# Patient Record
Sex: MALE | Race: WHITE | URBAN - METROPOLITAN AREA
[De-identification: names, ages, dates, MRNs, and addresses within clinical notes are randomized per-mention and may not be internally consistent; named-entity substitution may affect disease eponyms.]

---
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
## 2018-01-17 ENCOUNTER — EMERGENCY (EMERGENCY)
Facility: HOSPITAL | Age: 18
LOS: 0 days | Discharge: HOME | End: 2018-01-17
Attending: EMERGENCY MEDICINE | Admitting: EMERGENCY MEDICINE
Payer: MEDICAID

## 2018-01-17 VITALS
OXYGEN SATURATION: 99 % | TEMPERATURE: 98 F | HEART RATE: 62 BPM | SYSTOLIC BLOOD PRESSURE: 121 MMHG | RESPIRATION RATE: 18 BRPM | DIASTOLIC BLOOD PRESSURE: 61 MMHG

## 2018-01-17 VITALS — HEIGHT: 70 IN | WEIGHT: 246.92 LBS

## 2018-01-17 DIAGNOSIS — S83.92XA SPRAIN OF UNSPECIFIED SITE OF LEFT KNEE, INITIAL ENCOUNTER: ICD-10-CM

## 2018-01-17 DIAGNOSIS — M25.562 PAIN IN LEFT KNEE: ICD-10-CM
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
## 2018-01-17 PROCEDURE — 73562 X-RAY EXAM OF KNEE 3: CPT | Mod: 26,LT

## 2018-01-17 PROCEDURE — 99284 EMERGENCY DEPT VISIT MOD MDM: CPT

## 2018-01-17 RX ORDER — IBUPROFEN 200 MG
600 TABLET ORAL ONCE
Qty: 0 | Refills: 0 | Status: COMPLETED | OUTPATIENT
Start: 2018-01-17 | End: 2018-01-17

## 2018-01-17 RX ADMIN — Medication 600 MILLIGRAM(S): at 14:10

## 2018-01-17 NOTE — ED ADULT NURSE NOTE - OBJECTIVE STATEMENT
Pt c/o left knee pain. Evaluated by NP with NP to bedside to admin knee immobilizer. Witnessed instruction for use of crutches with demonstrated understanding.

## 2018-01-17 NOTE — ED STATDOCS - PROGRESS NOTE DETAILS
17 yr. old male PMH: Torn Meniscus presents to ED with pain and injury to left knee while at [employer] doing [employer] jitsu  pain last night and unable to walk without pain this morning. Seen and examined by attending in intake. Plan: X-Ray and pain medication. Will F/U with results and re evaluate. Sebastien MCKEON

## 2018-01-17 NOTE — ED STATDOCS - SKIN, MLM
skin normal color for race, warm, dry and intact. skin normal color for race, warm, dry and intact. No rashes.

## 2018-01-17 NOTE — ED STATDOCS - NS_ ATTENDINGSCRIBEDETAILS _ED_A_ED_FT
I Jose A Salazar MD saw and examined the patient. Scribe documented for me and under my supervision. I have modified the scribe's documentation where necessary to reflect my history, physical exam findings and other relevant documentation pertinent to the care of this patient.

## 2018-01-17 NOTE — ED STATDOCS - ATTENDING CONTRIBUTION TO CARE
I Jose A Salazar MD saw and examined the patient. MLP saw and examined the patient. I discussed the care of the patient with MLP and agree with MLP's plan, assessment and care of the patient.

## 2018-01-17 NOTE — ED STATDOCS - OBJECTIVE STATEMENT
18 y/o male with no PMHx presents to the ED c/o left knee pain s/p hearing a "pop" while working out a week ago. Pt went to his pediatrician who diagnosed it as a strain. Pt heard another "pop" while ta the gym. Pain has increased since yesterday. Pt states he has had torn lateral meniscus in the past. 18 y/o male with no PMHx presents to the ED c/o left knee pain s/p hearing a "pop" while working out a week ago. Pt went to his pediatrician who diagnosed it as a strain. Pt heard another "pop" while at the gym. Pain has increased since yesterday. Pt states he has had torn lateral meniscus in the past.  No saddle anesthesia. No incontinence. No recent exotic travel. Can ambulate but with pain in the knee.

## 2018-01-17 NOTE — ED STATDOCS - MUSCULOSKELETAL, MLM
+TTP of left knee, mild effusion of left knee. Intact ROM of flexion and extension. 2+ pulses in popliteal arteries.

## 2019-03-23 ENCOUNTER — EMERGENCY (EMERGENCY)
Facility: HOSPITAL | Age: 19
LOS: 0 days | Discharge: HOME | End: 2019-03-23
Attending: EMERGENCY MEDICINE | Admitting: EMERGENCY MEDICINE
Payer: MEDICAID

## 2019-03-23 VITALS
HEIGHT: 70 IN | DIASTOLIC BLOOD PRESSURE: 65 MMHG | RESPIRATION RATE: 18 BRPM | WEIGHT: 244.93 LBS | TEMPERATURE: 97 F | SYSTOLIC BLOOD PRESSURE: 131 MMHG | HEART RATE: 67 BPM | OXYGEN SATURATION: 100 %

## 2019-03-23 DIAGNOSIS — S43.101A UNSPECIFIED DISLOCATION OF RIGHT ACROMIOCLAVICULAR JOINT, INITIAL ENCOUNTER: ICD-10-CM
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
## 2019-03-23 DIAGNOSIS — I45.6 PRE-EXCITATION SYNDROME: ICD-10-CM
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
## 2019-03-23 PROCEDURE — 99284 EMERGENCY DEPT VISIT MOD MDM: CPT

## 2019-03-23 PROCEDURE — 73060 X-RAY EXAM OF HUMERUS: CPT | Mod: 26,RT

## 2019-03-23 PROCEDURE — 73080 X-RAY EXAM OF ELBOW: CPT | Mod: 26,RT

## 2019-03-23 PROCEDURE — 73030 X-RAY EXAM OF SHOULDER: CPT | Mod: 26,RT

## 2019-03-23 RX ORDER — LIDOCAINE 4 G/100G
1 CREAM TOPICAL
Qty: 8 | Refills: 0 | OUTPATIENT
Start: 2019-03-23

## 2019-03-23 RX ORDER — OXYCODONE HYDROCHLORIDE 5 MG/1
10 TABLET ORAL ONCE
Qty: 0 | Refills: 0 | Status: DISCONTINUED | OUTPATIENT
Start: 2019-03-23 | End: 2019-03-23

## 2019-03-23 RX ADMIN — Medication 1 MILLIGRAM(S): at 17:28

## 2019-03-23 NOTE — ED STATDOCS - MUSCULOSKELETAL, MLM
No deformity in finger, wrists or elbow. +TTP of right shoulder no visible deformity. No deformity in finger, wrists or elbow. +TTP of right shoulder no visible deformity. No laxity of deformities.

## 2019-03-23 NOTE — ED STATDOCS - ATTENDING CONTRIBUTION TO CARE
I Jose A Salazar MD saw and examined the patient. MLP saw and examined the patient under my supervision. I discussed the care of the patient with MLP and agree with MLP's plan, assessment and care of the patient while in the ED.

## 2019-03-23 NOTE — ED STATDOCS - NS_ ATTENDINGSCRIBEDETAILS _ED_A_ED_FT
I Jose A Salazar MD saw and examined the patient. Scribe documented for me and under my supervision. I have modified the scribe's documentation where necessary to reflect my history, physical exam and other relevant documentations pertinent to the care of the patient.

## 2019-03-23 NOTE — ED ADULT NURSE NOTE - NSIMPLEMENTINTERV_GEN_ALL_ED
Implemented All Universal Safety Interventions:  [place] to call system. Call bell, personal items and telephone within reach. Instruct patient to call for assistance. Room bathroom lighting operational. Non-slip footwear when patient is off stretcher. Physically safe environment: no spills, clutter or unnecessary equipment. Stretcher in lowest position, wheels locked, appropriate side rails in place.

## 2019-03-23 NOTE — ED STATDOCS - CARDIAC, MLM
normal rate, regular rhythm, and no murmur. 2+ pulses in right radial artery. normal rate, regular rhythm, and no gallops or rubs. 2+ pulses in right radial artery.

## 2019-03-23 NOTE — ED STATDOCS - CARE PROVIDER_API CALL
Luis Rubio)  Orthopaedic Surgery; Surgery of the Hand  [address] [place] [address]  Phone: [phone]  Fax: [phone]  Follow Up Time:
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
## 2019-03-23 NOTE — ED STATDOCS - CLINICAL SUMMARY MEDICAL DECISION MAKING FREE TEXT BOX
Plan for pain management and XR. If shoulder dislocation, will attempt to deduce. If unsuccessful, will contact orthopedics. Plan for pain management and XR. If shoulder dislocation, will attempt to deduce. If unsuccessful, will contact orthopedics. No dislocation on XR. Sling applied. Plan for d/c home with orthopedic follow up.

## 2019-03-23 NOTE — ED STATDOCS - NEUROLOGICAL, MLM
sensation is normal and strength is normal. sensation is normal and strength is normal. CNs 2-12 grossly intact. No nuchal rigidity.

## 2019-03-29 ENCOUNTER — APPOINTMENT (OUTPATIENT)
Dept: ORTHOPEDIC SURGERY | Facility: CLINIC | Age: 19
End: 2019-03-29
Payer: COMMERCIAL

## 2019-03-29 VITALS
DIASTOLIC BLOOD PRESSURE: 68 MMHG | HEART RATE: 54 BPM | BODY MASS INDEX: 38.22 KG/M2 | HEIGHT: 70 IN | SYSTOLIC BLOOD PRESSURE: 118 MMHG | WEIGHT: 267 LBS

## 2019-03-29 DIAGNOSIS — S43.101A UNSPECIFIED DISLOCATION OF RIGHT ACROMIOCLAVICULAR JOINT, INITIAL ENCOUNTER: ICD-10-CM

## 2019-03-29 DIAGNOSIS — M75.21 BICIPITAL TENDINITIS, RIGHT SHOULDER: ICD-10-CM

## 2019-03-29 PROCEDURE — 99204 OFFICE O/P NEW MOD 45 MIN: CPT

## 2019-03-29 RX ORDER — METHYLPREDNISOLONE 4 MG/1
4 TABLET ORAL
Qty: 1 | Refills: 0 | Status: ACTIVE | COMMUNITY
Start: 2019-03-29 | End: 1900-01-01

## 2019-03-29 NOTE — ASSESSMENT
[FreeTextEntry1] : Patient with right shoulder pain consistent with a.c. joint separation and biceps tendinitis. His range of motion and pain have improved since the actual injury. He does have some residual discomfort however. The nature of this condition was described at length with the patient and he verbalized understanding. I will send the patient a Medrol Dosepak to bring down any inflammation of the joint and the biceps tendon. I would also like the patient to do a short course of physical therapy to work on range of motion exercises and to prevent stiffness. This will likely give the patient the best opportunity to return to his [facility] as soon as possible. Visual followup in 4 weeks should his pain not be alleviated with the current treatment plan. The patient is in agreement with this plan.

## 2019-03-29 NOTE — PHYSICAL EXAM
[de-identified] : Right Shoulder Exam\par \par Inspection: No malalignment, No defects\par Skin: No masses, No lesions\par Neck: Negative Spurlings, full ROM without pain\par ROM: RIGHT Active FF to 170°, abduction to 110°, ER to 60°, IR to abdomen\par Painful arc ROM: Overhead\par Tenderness: No bicipital tenderness, no tenderness to the greater tuberosity/RTC insertion, no anterior shoulder/lesser tuberosity tenderness\par Strength: 5/5 ER, 5/5 IR in adduction, 5/5 supraspinatus testing\par AC Joint: No ttp, no pain with cross arm testing\par Biceps: Speed positive\par Impingement test: Negative Thomas\par Stability: Negative apprehension, negative anterior/posterior load and shift\par Vasc: 2+ radial pulse\par Neuro: AIN, PIN, Ulnar nerve in tact to motor\par Sensation: In tact to light touch throughout\par \par  [de-identified] : 3 x-ray views of the right shoulder were reviewed from an outside institution. There are no fractures or dislocations noted. There is some mild separation of the a.c. joint.
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
## 2019-04-06 PROBLEM — I45.6 PRE-EXCITATION SYNDROME: Chronic | Status: INACTIVE | Noted: 2019-03-23 | Resolved: 2019-04-05

## 2019-07-21 NOTE — ED PROVIDER NOTE - CARDIAC, MLM
Normal rate, regular rhythm.  Heart sounds S1, S2.  No murmurs, rubs or gallops. Patient with one or more new problems requiring additional work-up/treatment.

## 2019-12-08 ENCOUNTER — TRANSCRIPTION ENCOUNTER (OUTPATIENT)
Age: 19
End: 2019-12-08

## 2020-12-05 ENCOUNTER — OUTPATIENT (OUTPATIENT)
Dept: OUTPATIENT SERVICES | Facility: HOSPITAL | Age: 20
LOS: 1 days | End: 2020-12-05
Payer: MEDICAID

## 2020-12-05 DIAGNOSIS — Z11.59 ENCOUNTER FOR SCREENING FOR OTHER VIRAL DISEASES: ICD-10-CM

## 2020-12-05 PROBLEM — I45.6 PRE-EXCITATION SYNDROME: Chronic | Status: ACTIVE | Noted: 2019-04-05

## 2020-12-05 LAB — SARS-COV-2 RNA SPEC QL NAA+PROBE: SIGNIFICANT CHANGE UP

## 2020-12-05 PROCEDURE — U0003: CPT

## 2020-12-06 DIAGNOSIS — Z11.59 ENCOUNTER FOR SCREENING FOR OTHER VIRAL DISEASES: ICD-10-CM

## 2021-04-20 ENCOUNTER — APPOINTMENT (OUTPATIENT)
Dept: ORTHOPEDIC SURGERY | Facility: CLINIC | Age: 21
End: 2021-04-20
Payer: MEDICAID

## 2021-04-20 DIAGNOSIS — M75.42 IMPINGEMENT SYNDROME OF LEFT SHOULDER: ICD-10-CM

## 2021-04-20 DIAGNOSIS — M67.814 OTHER SPECIFIED DISORDERS OF TENDON, LEFT SHOULDER: ICD-10-CM

## 2021-04-20 DIAGNOSIS — M25.512 PAIN IN LEFT SHOULDER: ICD-10-CM

## 2021-04-20 PROCEDURE — 99072 ADDL SUPL MATRL&STAF TM PHE: CPT

## 2021-04-20 PROCEDURE — 20610 DRAIN/INJ JOINT/BURSA W/O US: CPT | Mod: LT

## 2021-04-20 PROCEDURE — 73030 X-RAY EXAM OF SHOULDER: CPT | Mod: 26,LT

## 2021-04-20 PROCEDURE — 99213 OFFICE O/P EST LOW 20 MIN: CPT | Mod: 25

## 2021-04-20 RX ORDER — MELOXICAM 15 MG/1
15 TABLET ORAL
Qty: 14 | Refills: 1 | Status: ACTIVE | COMMUNITY
Start: 2021-04-20 | End: 1900-01-01

## 2021-04-20 NOTE — ASSESSMENT
[FreeTextEntry1] : Patient with left shoulder pain consistent with biceps tendinitis and bursitis. \par At this time the patient has been informed of the findings and recommended for conservative treatment of his left left shoulder biceps tendinitis and bursitis.  He has been recommended for a steroid injection today to the subacromial space, agreed to proceed, received the injection and tolerated the procedure well.  He has been recommended to avoid heavy activity over the next 2 to 3 days to allow for symptoms to resolve.  He has been recommended for home exercise program to maintain strength and mobility.  He has been provided a prescription for anti-inflammatory, meloxicam and will take it for the next 2 weeks to ease inflammation.  He has been recommended to follow-up in 4 to 6 weeks if symptoms continue for other options of treatment. no cough/no dyspnea/no wheezing/no hemoptysis

## 2021-04-20 NOTE — PROCEDURE
[FreeTextEntry1] : Injection: LEFT Shoulder\par \par A discussion was had with the patient regarding this procedure and all questions were answered. All risks, benefits and alternatives were discussed. These included but were not limited to bleeding, infection, and allergic reaction. Alcohol was used to clean and prep the area in the posterior aspect of the left shoulder. Ethyl chloride spray was then used as a topical anesthetic. A 22-gauge needle was used to inject 3cc of 1% lidocaine and 1cc of 40mg/ml Depomedrol into the subacromial space. A sterile bandage was then applied. The patient tolerated the procedure well and there were no complications.\par
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
## 2021-04-20 NOTE — PHYSICAL EXAM
[de-identified] : LEFT shoulder exam\par \par Inspection: No malalignment, No defects\par Skin: No masses, No lesions\par Neck: Negative Spurlings, full ROM without pain\par ROM: RIGHT Active FF to 170°, abduction to 110°, ER to 60°, IR to mid back.\par Painful arc ROM: Overhead\par Tenderness: positive bicipital tenderness, no tenderness to the greater tuberosity/RTC insertion, no anterior shoulder/lesser tuberosity tenderness\par Strength: 5/5 ER, 5/5 IR in adduction, 5/5 supraspinatus testing\par AC Joint: No ttp, no pain with cross arm testing\par Biceps: Speed positive\par Impingement test: Positive Thomas\par Stability: Negative apprehension, negative anterior/posterior load and shift\par Vasc: 2+ radial pulse\par Neuro: AIN, PIN, Ulnar nerve in tact to motor\par Sensation: In tact to light touch throughout\par \par  [de-identified] : 3 x-ray views of the left shoulder taken today reveal no fracture dislocation noted.  There is mild separation of the AC joint, which is comparative to the other side with review of previous right shoulder x-rays

## 2021-05-27 ENCOUNTER — EMERGENCY (EMERGENCY)
Facility: HOSPITAL | Age: 21
LOS: 0 days | Discharge: HOME | End: 2021-05-27
Attending: STUDENT IN AN ORGANIZED HEALTH CARE EDUCATION/TRAINING PROGRAM
Payer: OTHER MISCELLANEOUS

## 2021-05-27 VITALS
RESPIRATION RATE: 16 BRPM | OXYGEN SATURATION: 100 % | TEMPERATURE: 99 F | SYSTOLIC BLOOD PRESSURE: 114 MMHG | DIASTOLIC BLOOD PRESSURE: 80 MMHG | HEART RATE: 64 BPM

## 2021-05-27 VITALS — WEIGHT: 190.04 LBS | HEIGHT: 70 IN
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
## 2021-05-27 DIAGNOSIS — M54.5 LOW BACK PAIN: ICD-10-CM
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
## 2021-05-27 PROCEDURE — 99053 MED SERV 10PM-8AM 24 HR FAC: CPT

## 2021-05-27 PROCEDURE — 99283 EMERGENCY DEPT VISIT LOW MDM: CPT | Mod: 25

## 2021-05-27 PROCEDURE — 99284 EMERGENCY DEPT VISIT MOD MDM: CPT

## 2021-05-27 PROCEDURE — 96372 THER/PROPH/DIAG INJ SC/IM: CPT

## 2021-05-27 RX ORDER — LIDOCAINE 4 G/100G
1 CREAM TOPICAL ONCE
Refills: 0 | Status: COMPLETED | OUTPATIENT
Start: 2021-05-27 | End: 2021-05-27

## 2021-05-27 RX ORDER — KETOROLAC TROMETHAMINE 30 MG/ML
30 SYRINGE (ML) INJECTION ONCE
Refills: 0 | Status: DISCONTINUED | OUTPATIENT
Start: 2021-05-27 | End: 2021-05-27

## 2021-05-27 RX ORDER — CYCLOBENZAPRINE HYDROCHLORIDE 10 MG/1
1 TABLET, FILM COATED ORAL
Qty: 9 | Refills: 0
Start: 2021-05-27 | End: 2021-05-29

## 2021-05-27 RX ORDER — LIDOCAINE 4 G/100G
1 CREAM TOPICAL
Qty: 10 | Refills: 0
Start: 2021-05-27 | End: 2021-06-05

## 2021-05-27 RX ADMIN — Medication 30 MILLIGRAM(S): at 09:15

## 2021-05-27 RX ADMIN — LIDOCAINE 1 PATCH: 4 CREAM TOPICAL at 09:15

## 2021-05-27 NOTE — ED ADULT NURSE NOTE - NURSING NEURO LEVEL OF CONSCIOUSNESS
CERTIFICATE OF WORK      April 14, 2021      RE:   Delvin Lorenzana  [address]  [place] [address]    To whom it may concern:    This is to certify that Delvin Lorenzana has been under Ap Tucker MD's care from 4/14/2021 and is excused from work on 4/14/2021.        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  
alert and awake

## 2021-05-27 NOTE — ED PROVIDER NOTE - NSFOLLOWUPINSTRUCTIONS_ED_ALL_ED_FT
Back Pain    WHAT YOU NEED TO KNOW:    Back pain is common. It can be caused by many conditions, such as arthritis or the breakdown of spinal discs. Your risk for back pain is increased by injuries, lack of activity, or repeated bending and twisting. You may feel sore or stiff on one or both sides of your back. The pain may spread to your buttocks or thighs.    DISCHARGE INSTRUCTIONS:    Return to the emergency department if:     You have pain, numbness, or weakness in one or both legs.      Your pain becomes so severe that you cannot walk.      You cannot control your urine or bowel movements.      You have severe back pain with chest pain.      You have severe back pain, nausea, and vomiting.      You have severe back pain that spreads to your side or genital area.    Contact your healthcare provider if:     You have back pain that does not get better with rest and pain medicine.      You have a fever.      You have pain that worsens when you are on your back or when you rest.      You have pain that worsens when you cough or sneeze.      You lose weight without trying.      You have questions or concerns about your condition or care.    Medicines:     NSAIDs help decrease swelling and pain. This medicine is available with or without a doctor's order. NSAIDs can cause stomach bleeding or kidney problems in certain people. If you take blood thinner medicine, always ask your healthcare provider if NSAIDs are safe for you. Always read the medicine label and follow directions.      Acetaminophen decreases pain and fever. It is available without a doctor's order. Ask how much to take and how often to take it. Follow directions. Read the labels of all other medicines you are using to see if they also contain acetaminophen, or ask your doctor or pharmacist. Acetaminophen can cause liver damage if not taken correctly. Do not use more than 4 grams (4,000 milligrams) total of acetaminophen in one day.       Muscle relaxers help decrease muscle spasms and back pain.      Prescription pain medicine may be given. Ask your healthcare provider how to take this medicine safely. Some prescription pain medicines contain acetaminophen. Do not take other medicines that contain acetaminophen without talking to your healthcare provider. Too much acetaminophen may cause liver damage. Prescription pain medicine may cause constipation. Ask your healthcare provider how to prevent or treat constipation.       Take your medicine as directed. Contact your healthcare provider if you think your medicine is not helping or if you have side effects. Tell him or her if you are allergic to any medicine. Keep a list of the medicines, vitamins, and herbs you take. Include the amounts, and when and why you take them. Bring the list or the pill bottles to follow-up visits. Carry your medicine list with you in case of an emergency.    How to manage your back pain:     Apply ice on your back for 15 to 20 minutes every hour or as directed. Use an ice pack, or put crushed ice in a plastic bag. Cover it with a towel before you apply it to your skin. Ice helps prevent tissue damage and decreases pain.      Apply heat on your back for 20 to 30 minutes every 2 hours for as many days as directed. Heat helps decrease pain and muscle spasms.      Stay active as much as you can without causing more pain. Bed rest could make your back pain worse. Avoid heavy lifting until your pain is gone.      Go to physical therapy as directed. A physical therapist can teach you exercises to help improve movement and strength, and to decrease pain.    Follow up with your healthcare provider in 2 weeks, or as directed: Write down your questions so you remember to ask them during your visits.    NO WORK UNTIL CLEARED BY PMD

## 2021-05-27 NOTE — ED PROVIDER NOTE - PATIENT PORTAL LINK FT
You can access the FollowMyHealth Patient Portal offered by [facility] by registering at the following website: [email or web address]. By joining "[employer]’s FollowMyHealth portal, you will also be able to view your health information using other applications (apps) compatible with our system.

## 2021-05-27 NOTE — ED PROVIDER NOTE - PROGRESS NOTE DETAILS
Nhi DO: patient resting comfortably; gait steady; no red flags; f/u with pmd in 1-2 days without fail; strict return precautions given.

## 2021-05-27 NOTE — ED PROVIDER NOTE - MUSCULOSKELETAL, MLM
Spine appears normal, range of motion is not limited, (+) left sided paravertebral lumbar tenderness; no midline tenderness; neg. straight leg raise b/l; ambulatory in ED

## 2021-05-27 NOTE — ED PROVIDER NOTE - OBJECTIVE STATEMENT
Patient is a 21 yo male with hx of WPW s/p ablation; patient was at work yesterday; was lifting trailer when he felt pain shoot down his back; radiates to left side; worse with movement and change in position; no numbness or tingling; no incontinence of bowel or bladder function; no relief with OTC medications.

## 2021-05-27 NOTE — ED ADULT NURSE NOTE - OBJECTIVE STATEMENT
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 Today patient states pain has not gone away so he came to the ED

## 2021-10-26 ENCOUNTER — TRANSCRIPTION ENCOUNTER (OUTPATIENT)
Age: 21
End: 2021-10-26

## 2022-05-02 ENCOUNTER — NON-APPOINTMENT (OUTPATIENT)
Age: 22
End: 2022-05-02

## 2022-05-16 ENCOUNTER — NON-APPOINTMENT (OUTPATIENT)
Age: 22
End: 2022-05-16

## 2022-07-07 ENCOUNTER — NON-APPOINTMENT (OUTPATIENT)
Age: 22
End: 2022-07-07

## 2022-12-08 ENCOUNTER — NON-APPOINTMENT (OUTPATIENT)
Age: 22
End: 2022-12-08

## 2023-05-12 ENCOUNTER — EMERGENCY (EMERGENCY)
Facility: HOSPITAL | Age: 23
LOS: 0 days | Discharge: HOME | End: 2023-05-12
Attending: EMERGENCY MEDICINE
Payer: MEDICAID

## 2023-05-12 VITALS
DIASTOLIC BLOOD PRESSURE: 78 MMHG | WEIGHT: 268.08 LBS | HEIGHT: 61 IN | TEMPERATURE: 98 F | OXYGEN SATURATION: 98 % | SYSTOLIC BLOOD PRESSURE: 123 MMHG | HEART RATE: 68 BPM | RESPIRATION RATE: 19 BRPM

## 2023-05-12 VITALS
DIASTOLIC BLOOD PRESSURE: 77 MMHG | HEART RATE: 65 BPM | RESPIRATION RATE: 20 BRPM | TEMPERATURE: 98 F | SYSTOLIC BLOOD PRESSURE: 124 MMHG | OXYGEN SATURATION: 98 %

## 2023-05-12 DIAGNOSIS — I45.6 PRE-EXCITATION SYNDROME: ICD-10-CM

## 2023-05-12 DIAGNOSIS — M62.838 OTHER MUSCLE SPASM: ICD-10-CM

## 2023-05-12 DIAGNOSIS — M54.50 LOW BACK PAIN, UNSPECIFIED: ICD-10-CM

## 2023-05-12 DIAGNOSIS — Z88.5 ALLERGY STATUS TO NARCOTIC AGENT: ICD-10-CM

## 2023-05-12 PROCEDURE — 99284 EMERGENCY DEPT VISIT MOD MDM: CPT

## 2023-05-12 PROCEDURE — 99283 EMERGENCY DEPT VISIT LOW MDM: CPT

## 2023-05-12 RX ORDER — IBUPROFEN 200 MG
600 TABLET ORAL ONCE
Refills: 0 | Status: COMPLETED | OUTPATIENT
Start: 2023-05-12 | End: 2023-05-12

## 2023-05-12 RX ORDER — ACETAMINOPHEN 500 MG
1000 TABLET ORAL ONCE
Refills: 0 | Status: COMPLETED | OUTPATIENT
Start: 2023-05-12 | End: 2023-05-12

## 2023-05-12 RX ORDER — DIAZEPAM 5 MG
1 TABLET ORAL
Qty: 9 | Refills: 0
Start: 2023-05-12 | End: 2023-05-14

## 2023-05-12 RX ORDER — DIAZEPAM 5 MG
5 TABLET ORAL ONCE
Refills: 0 | Status: DISCONTINUED | OUTPATIENT
Start: 2023-05-12 | End: 2023-05-12

## 2023-05-12 RX ORDER — LIDOCAINE 4 G/100G
1 CREAM TOPICAL ONCE
Refills: 0 | Status: COMPLETED | OUTPATIENT
Start: 2023-05-12 | End: 2023-05-12

## 2023-05-12 RX ADMIN — LIDOCAINE 1 PATCH: 4 CREAM TOPICAL at 08:07

## 2023-05-12 RX ADMIN — Medication 1000 MILLIGRAM(S): at 08:07

## 2023-05-12 RX ADMIN — Medication 600 MILLIGRAM(S): at 08:07

## 2023-05-12 RX ADMIN — Medication 5 MILLIGRAM(S): at 08:07

## 2023-05-12 NOTE — ED PROVIDER NOTE - PHYSICAL EXAMINATION
Constitutional: mild distress AAOx3  Eyes: PERRLA EOMI  Head: Normocephalic atraumatic  Mouth: MMM  Cardiac: regular rate normal peripheral pulses no LE swelling  Resp: unlabored breathing clear b/l  GI: Abd s/nt/nd  Neuro: grossly normal and intact no saddle anesthesia no foot droop  Skin: No visible rashes  msk: no cvat no midline spinal ttp + ttp right paraspinal and lumbar muscles

## 2023-05-12 NOTE — ED PROVIDER NOTE - CARE PROVIDER_API CALL
Fanta Armenta (DO)  Orthopaedic Surgery Surgery  [address] [facility], [address] [place] [address]  Phone: [phone]  Fax: [phone]  Follow Up Time: 1-3 Days

## 2023-05-12 NOTE — ED PROVIDER NOTE - PATIENT PORTAL LINK FT
You can access the FollowMyHealth Patient Portal offered by [facility] by registering at the following website: [email or web address]. By joining [facility]’s FollowMyHealth portal, you will also be able to view your health information using other applications (apps) compatible with our system.

## 2023-05-12 NOTE — ED ADULT NURSE NOTE - NSFALLUNIVINTERV_ED_ALL_ED
Bed/Stretcher in lowest position, wheels locked, appropriate side rails in place/Call bell, personal items and telephone in reach/Instruct patient to call for assistance before getting out of bed/chair/stretcher/Non-slip footwear applied when patient is off stretcher/[place] to call system/Physically safe environment - no spills, clutter or unnecessary equipment/Purposeful proactive rounding/Room/bathroom lighting operational, light cord in reach

## 2023-05-12 NOTE — ED PROVIDER NOTE - CLINICAL SUMMARY MEDICAL DECISION MAKING FREE TEXT BOX
22-year-old male presents to the ED with right lower back spasms.  Exam with right paraspinal muscle and lumbar muscle tenderness no midline spinal tenderness negative straight leg raise no saddle anesthesia.  Likely muscle spasm from previous sports injuries as per patient's no signs or concern at this time for cord compression fracture osteo discitis epidural abscess or hematoma pyelonephritis vascular injury.  Will pain control patient will need spine follow-up. 22-year-old male presents to the ED with right lower back spasms.  Exam with right paraspinal muscle and lumbar muscle tenderness no midline spinal tenderness negative straight leg raise no saddle anesthesia.  Likely muscle spasm from previous sports injuries as per patient's no signs or concern at this time for cord compression fracture osteo discitis epidural abscess or hematoma pyelonephritis vascular injury.  Will pain control patient will need spine follow-up.    Patient feeling better he is ambulating and comfortable going home.  Patient informed that he needs to follow-up with orthopedic spine patient is comfortable with this plan will DC with follow-up and strict return precautions.    No risk factors or findings concerning for epidural abscess, diskitis, vertebral osteo, cord compression, cauda equina, vertebral fracture or radha malignancy, AAA, or pyelonephritis.  Patient instructed to consider further imaging and workup through their primary care physician/spine as an outpatient, if symptoms persist.

## 2023-05-12 NOTE — ED PROVIDER NOTE - OBJECTIVE STATEMENT
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  Patient states patient has had back issues for from sports injuries.  Over the last few days has been having tightening and spasms in the lower back.  Pain does not radiate down legs no numbness or weakness no urinary incontinence no fevers no weight loss or B symptoms tried taking Motrin and Tylenol last night as well as Flexeril that his friend gave him but the symptoms have not improved.  Came in for further evaluation.

## 2023-05-12 NOTE — ED PROVIDER NOTE - NSFOLLOWUPINSTRUCTIONS_ED_ALL_ED_FT
1. return for worsening symptoms or anything concerning to you  2. take all home meds as prescribed  3. follow up with your pmd call to make an appointment  4. Take Tylenol 650 mg every 6 hours as needed for pain.  5. Take motrin 600mg PO Q6 hours prn pain  6. take valium as needed for severe spasms as directed do not drink or drive while taking this med  7. follow up with spine see attached    Back Pain    WHAT YOU NEED TO KNOW:    Back pain is common. It can be caused by many conditions, such as arthritis or the breakdown of spinal discs. Your risk for back pain is increased by injuries, lack of activity, or repeated bending and twisting. You may feel sore or stiff on one or both sides of your back. The pain may spread to your buttocks or thighs.    DISCHARGE INSTRUCTIONS:    Return to the emergency department if:     You have pain, numbness, or weakness in one or both legs.      Your pain becomes so severe that you cannot walk.      You cannot control your urine or bowel movements.      You have severe back pain with chest pain.      You have severe back pain, nausea, and vomiting.      You have severe back pain that spreads to your side or genital area.    Contact your healthcare provider if:     You have back pain that does not get better with rest and pain medicine.      You have a fever.      You have pain that worsens when you are on your back or when you rest.      You have pain that worsens when you cough or sneeze.      You lose weight without trying.      You have questions or concerns about your condition or care.    Medicines:     NSAIDs help decrease swelling and pain. This medicine is available with or without a doctor's order. NSAIDs can cause stomach bleeding or kidney problems in certain people. If you take blood thinner medicine, always ask your healthcare provider if NSAIDs are safe for you. Always read the medicine label and follow directions.      Acetaminophen decreases pain and fever. It is available without a doctor's order. Ask how much to take and how often to take it. Follow directions. Read the labels of all other medicines you are using to see if they also contain acetaminophen, or ask your doctor or pharmacist. Acetaminophen can cause liver damage if not taken correctly. Do not use more than 4 grams (4,000 milligrams) total of acetaminophen in one day.       Muscle relaxers help decrease muscle spasms and back pain.      Prescription pain medicine may be given. Ask your healthcare provider how to take this medicine safely. Some prescription pain medicines contain acetaminophen. Do not take other medicines that contain acetaminophen without talking to your healthcare provider. Too much acetaminophen may cause liver damage. Prescription pain medicine may cause constipation. Ask your healthcare provider how to prevent or treat constipation.       Take your medicine as directed. Contact your healthcare provider if you think your medicine is not helping or if you have side effects. Tell him or her if you are allergic to any medicine. Keep a list of the medicines, vitamins, and herbs you take. Include the amounts, and when and why you take them. Bring the list or the pill bottles to follow-up visits. Carry your medicine list with you in case of an emergency.    How to manage your back pain:     Apply ice on your back for 15 to 20 minutes every hour or as directed. Use an ice pack, or put crushed ice in a plastic bag. Cover it with a towel before you apply it to your skin. Ice helps prevent tissue damage and decreases pain.      Apply heat on your back for 20 to 30 minutes every 2 hours for as many days as directed. Heat helps decrease pain and muscle spasms.      Stay active as much as you can without causing more pain. Bed rest could make your back pain worse. Avoid heavy lifting until your pain is gone.      Go to physical therapy as directed. A physical therapist can teach you exercises to help improve movement and strength, and to decrease pain.    Follow up with your healthcare provider in 2 weeks, or as directed: Write down your questions so you remember to ask them during your visits.

## 2023-05-12 NOTE — ED ADULT NURSE NOTE - OBJECTIVE STATEMENT
patient complaining of worsening back pain since yesterday. took Alleve, Tylenol, & cyclobenzipine; used heat packs, cold application, and Icee Hot PTA with some relief. Pt is laying on stretcher in hallway now. Pt denies trauma or injury to his back, reports that the pain came on sudden yesterday. Pt endorses a hx of muscle spasm but states that "This is much worse than that". Pt denies numbness in legs, bladder dysfunction, constipation or motor function loss.

## 2023-05-12 NOTE — ED ADULT TRIAGE NOTE - CHIEF COMPLAINT QUOTE
patient complaining of worsening back pain since yesterday. took Alleve, Tylenol, & cyclobenzipine; used heat packs, cold application, and Icee Hot PTA without relief.

## 2023-05-20 NOTE — ED ADULT NURSE NOTE - PRIMARY CARE PROVIDER
Please return to the ED if symptoms worsen, persist, recur. Please take all your medications as prescribed. Please follow-up with PCP and cardiology  as discussed. see MD note

## 2024-02-18 ENCOUNTER — NON-APPOINTMENT (OUTPATIENT)
Age: 24
End: 2024-02-18

## 2024-11-12 ENCOUNTER — NON-APPOINTMENT (OUTPATIENT)
Age: 24
End: 2024-11-12